# Patient Record
Sex: FEMALE | Race: WHITE | NOT HISPANIC OR LATINO
[De-identification: names, ages, dates, MRNs, and addresses within clinical notes are randomized per-mention and may not be internally consistent; named-entity substitution may affect disease eponyms.]

---

## 2018-12-20 ENCOUNTER — APPOINTMENT (OUTPATIENT)
Dept: GASTROENTEROLOGY | Facility: CLINIC | Age: 58
End: 2018-12-20
Payer: COMMERCIAL

## 2018-12-20 VITALS
WEIGHT: 186 LBS | DIASTOLIC BLOOD PRESSURE: 83 MMHG | BODY MASS INDEX: 29.19 KG/M2 | HEART RATE: 72 BPM | HEIGHT: 67 IN | SYSTOLIC BLOOD PRESSURE: 138 MMHG

## 2018-12-20 DIAGNOSIS — K76.0 FATTY (CHANGE OF) LIVER, NOT ELSEWHERE CLASSIFIED: ICD-10-CM

## 2018-12-20 DIAGNOSIS — Z83.79 FAMILY HISTORY OF OTHER DISEASES OF THE DIGESTIVE SYSTEM: ICD-10-CM

## 2018-12-20 DIAGNOSIS — R14.0 ABDOMINAL DISTENSION (GASEOUS): ICD-10-CM

## 2018-12-20 DIAGNOSIS — Z85.828 PERSONAL HISTORY OF OTHER MALIGNANT NEOPLASM OF SKIN: ICD-10-CM

## 2018-12-20 DIAGNOSIS — K58.1 IRRITABLE BOWEL SYNDROME WITH CONSTIPATION: ICD-10-CM

## 2018-12-20 DIAGNOSIS — E66.3 OVERWEIGHT: ICD-10-CM

## 2018-12-20 DIAGNOSIS — K21.9 GASTRO-ESOPHAGEAL REFLUX DISEASE W/OUT ESOPHAGITIS: ICD-10-CM

## 2018-12-20 DIAGNOSIS — Z12.11 ENCOUNTER FOR SCREENING FOR MALIGNANT NEOPLASM OF COLON: ICD-10-CM

## 2018-12-20 DIAGNOSIS — E73.9 LACTOSE INTOLERANCE, UNSPECIFIED: ICD-10-CM

## 2018-12-20 DIAGNOSIS — R15.9 FULL INCONTINENCE OF FECES: ICD-10-CM

## 2018-12-20 DIAGNOSIS — L65.9 NONSCARRING HAIR LOSS, UNSPECIFIED: ICD-10-CM

## 2018-12-20 PROCEDURE — 82274 ASSAY TEST FOR BLOOD FECAL: CPT | Mod: QW

## 2018-12-20 PROCEDURE — 99244 OFF/OP CNSLTJ NEW/EST MOD 40: CPT

## 2018-12-20 RX ORDER — LORATADINE 5 MG
17 TABLET,CHEWABLE ORAL
Refills: 0 | Status: ACTIVE | COMMUNITY

## 2018-12-20 RX ORDER — BIOTIN 10 MG
10000 TABLET ORAL
Refills: 0 | Status: ACTIVE | COMMUNITY

## 2018-12-20 RX ORDER — FINASTERIDE 5 MG/1
5 TABLET, FILM COATED ORAL
Refills: 0 | Status: ACTIVE | COMMUNITY

## 2018-12-20 RX ORDER — POLYETHYLENE GLYCOL-3350, SODIUM CHLORIDE, POTASSIUM CHLORIDE AND SODIUM BICARBONATE 420; 11.2; 5.72; 1.48 G/438.4G; G/438.4G; G/438.4G; G/438.4G
420 POWDER, FOR SOLUTION ORAL
Qty: 1 | Refills: 0 | Status: ACTIVE | COMMUNITY
Start: 2018-12-20 | End: 1900-01-01

## 2018-12-24 ENCOUNTER — LABORATORY RESULT (OUTPATIENT)
Age: 58
End: 2018-12-24

## 2018-12-24 ENCOUNTER — APPOINTMENT (OUTPATIENT)
Dept: GASTROENTEROLOGY | Facility: CLINIC | Age: 58
End: 2018-12-24
Payer: COMMERCIAL

## 2018-12-24 PROCEDURE — 45380 COLONOSCOPY AND BIOPSY: CPT

## 2018-12-27 ENCOUNTER — TRANSCRIPTION ENCOUNTER (OUTPATIENT)
Age: 58
End: 2018-12-27

## 2023-07-27 ENCOUNTER — APPOINTMENT (OUTPATIENT)
Dept: ORTHOPEDIC SURGERY | Facility: CLINIC | Age: 63
End: 2023-07-27
Payer: COMMERCIAL

## 2023-07-27 VITALS — WEIGHT: 185 LBS | BODY MASS INDEX: 29.73 KG/M2 | HEIGHT: 66 IN

## 2023-07-27 DIAGNOSIS — M77.8 OTHER ENTHESOPATHIES, NOT ELSEWHERE CLASSIFIED: ICD-10-CM

## 2023-07-27 DIAGNOSIS — M19.011 PRIMARY OSTEOARTHRITIS, RIGHT SHOULDER: ICD-10-CM

## 2023-07-27 PROCEDURE — 73564 X-RAY EXAM KNEE 4 OR MORE: CPT | Mod: 50

## 2023-07-27 PROCEDURE — 99214 OFFICE O/P EST MOD 30 MIN: CPT

## 2023-07-27 PROCEDURE — 73030 X-RAY EXAM OF SHOULDER: CPT | Mod: RT

## 2023-07-27 RX ORDER — ATORVASTATIN CALCIUM 80 MG/1
TABLET, FILM COATED ORAL
Refills: 0 | Status: ACTIVE | COMMUNITY

## 2023-07-27 NOTE — DISCUSSION/SUMMARY
[de-identified] : Patient allowed to gently start resuming activities.\par Discussed change to medication prescription and usage. \par Offered cortisone steroid injection. \par Bracing options discussed with patient. \par Hyaluronic Acid inj pamphlet given to pt.\par use sleeves

## 2023-07-27 NOTE — HISTORY OF PRESENT ILLNESS
[8] : 8 [0] : 0 [Rest] : rest [Meds] : meds [de-identified] : 62 year old RHD  female with pain in bilateral knees and her right shoulder. The knees have been progressive, no specific injury. PAin with walking, stairs, stiffness. No mechanical symptoms. OTC topical creams and medications are helpful short term. Her right shoulder has been clicking, pain with reaching over head, lifting. Worse when hikes.No prior history of injury., has known labral tear L shoulder [] : no [FreeTextEntry1] : bilateral knees and rightshoulder  [FreeTextEntry5] : pt has been having ongoing bilateral knee pain, also having some right shoulder pain and clicking  [FreeTextEntry9] : motrin  [de-identified] : movement  [de-identified] : 2021 [de-identified] :   [de-identified] : a few years ago

## 2023-07-27 NOTE — PHYSICAL EXAM
[Sitting] : sitting [Moderate] : moderate [Right] : right shoulder [5___] : hamstring 5[unfilled]/5 [Bilateral] : knee bilaterally [All Views] : anteroposterior, lateral, skyline, and anteroposterior standing [There are no fractures, subluxations or dislocations. No significant abnormalities are seen] : There are no fractures, subluxations or dislocations. No significant abnormalities are seen [TWNoteComboBox6] : internal rotation L4 [de-identified] : external rotation 70 degrees [] : no lateral joint line tenderness [Degenerative change] : Degenerative change [TWNoteComboBox7] : flexion 125 degrees [de-identified] : extension 0 degrees

## 2024-02-02 ENCOUNTER — TRANSCRIPTION ENCOUNTER (OUTPATIENT)
Age: 64
End: 2024-02-02

## 2024-04-04 ENCOUNTER — APPOINTMENT (OUTPATIENT)
Dept: MRI IMAGING | Facility: CLINIC | Age: 64
End: 2024-04-04
Payer: COMMERCIAL

## 2024-04-04 ENCOUNTER — APPOINTMENT (OUTPATIENT)
Dept: ORTHOPEDIC SURGERY | Facility: CLINIC | Age: 64
End: 2024-04-04
Payer: COMMERCIAL

## 2024-04-04 VITALS — BODY MASS INDEX: 32.14 KG/M2 | WEIGHT: 200 LBS | HEIGHT: 66 IN

## 2024-04-04 DIAGNOSIS — M23.92 UNSPECIFIED INTERNAL DERANGEMENT OF LEFT KNEE: ICD-10-CM

## 2024-04-04 PROCEDURE — 73564 X-RAY EXAM KNEE 4 OR MORE: CPT | Mod: LT

## 2024-04-04 PROCEDURE — 73721 MRI JNT OF LWR EXTRE W/O DYE: CPT | Mod: LT

## 2024-04-04 PROCEDURE — 99214 OFFICE O/P EST MOD 30 MIN: CPT

## 2024-04-04 RX ORDER — IBUPROFEN 600 MG/1
600 TABLET ORAL
Qty: 30 | Refills: 0 | Status: ACTIVE | COMMUNITY
Start: 2024-04-04 | End: 1900-01-01

## 2024-04-04 RX ORDER — DUTASTERIDE 0.5 MG/1
CAPSULE, LIQUID FILLED ORAL
Refills: 0 | Status: ACTIVE | COMMUNITY

## 2024-04-04 RX ORDER — MINOXIDIL 2.5 MG/1
TABLET ORAL
Refills: 0 | Status: ACTIVE | COMMUNITY

## 2024-04-04 NOTE — DISCUSSION/SUMMARY
[de-identified] : MRI L knee - eval LMT ibu 600 HKB - pt has jordi corral p mri   -----------------------------------------------  Patient warned of specific risks of medication related to bleeding, GI issues, increase blood pressure, and cardiac risks in addition to additional risks. Patient advised to discuss with PMD if any presence of stated issues.

## 2024-04-04 NOTE — HISTORY OF PRESENT ILLNESS
[de-identified] : soreness began a few weeks ago in the left knee, worsened after she mis stepped while playing pickleball an hour ago. felt a pop in the outer left knee. pt sees dr richey for the knee. pain when standing, sitting, walking, pain at rest. difficulty ambulating pain radiates into the upper thigh when sitting.  [] : no

## 2024-04-04 NOTE — IMAGING
[Left] : left knee [Moderate tricompartmental OA medial narrowing] : Moderate tricompartmental OA medial narrowing [Moderate patellofemoral OA] : Moderate patellofemoral OA [de-identified] : Left Knee Exam:                             General: no distress, no ligamentous laxity Skin: no significant pertinent finding Inspection:  Effusion: (+)  Malalignment: (-)  Swelling: (-)  Quad atrophy: (-)  J-sign: (-) ROM:  3 - 90 degrees of flexion. Tenderness:  MJLT: (+)  MFC. (-)  LJLT: (+)  Crepitus: (+)  Patellar grind tenderness: (-)  Patellar tendon: (-)  + LCF Stability:  Lachman: (-)  Varus/Valgus instability: (-)  Posterior drawer: (-)  Patellar translation: wnl Additional tests:  McMurrays test: (+)  Patellar apprehension: (-)  Patellar tilt: (-)  Tight lateral retinaculum: (-) Strength: 5/5 Q/H/TA/GS/EHL Neuro: In tact to light touch throughout, DTR's wnl Vascularity: Extremity warm and well perfused Gait: antalgic     [FreeTextEntry9] : no obvious fracture or subluxation

## 2024-04-05 ENCOUNTER — APPOINTMENT (OUTPATIENT)
Dept: ORTHOPEDIC SURGERY | Facility: CLINIC | Age: 64
End: 2024-04-05
Payer: COMMERCIAL

## 2024-04-05 VITALS — WEIGHT: 200 LBS | BODY MASS INDEX: 32.14 KG/M2 | HEIGHT: 66 IN

## 2024-04-05 DIAGNOSIS — M23.307 OTHER MENISCUS DERANGEMENTS, UNSPECIFIED MENISCUS, LEFT KNEE: ICD-10-CM

## 2024-04-05 DIAGNOSIS — M25.852 OTHER SPECIFIED JOINT DISORDERS, LEFT HIP: ICD-10-CM

## 2024-04-05 PROCEDURE — 20610 DRAIN/INJ JOINT/BURSA W/O US: CPT | Mod: LT

## 2024-04-05 PROCEDURE — 99214 OFFICE O/P EST MOD 30 MIN: CPT | Mod: 25

## 2024-04-05 PROCEDURE — 99213 OFFICE O/P EST LOW 20 MIN: CPT | Mod: 25

## 2024-04-05 NOTE — HISTORY OF PRESENT ILLNESS
[Gradual] : gradual [8] : 8 [6] : 6 [Dull/Aching] : dull/aching [Sharp] : sharp [Stabbing] : stabbing [Frequent] : frequent [Leisure] : leisure [Rest] : rest [Exercising] : exercising [de-identified] : Pt went to urgent care and obtained MRI of the L knee and was previously seeing Dr Purdy  . Pt notes instability and pain in the L knee. Pt has never had CSI or HA injections of the L knee     [] : Post Surgical Visit: no [FreeTextEntry1] : left knee  [de-identified] : xrays/mri

## 2024-04-05 NOTE — DISCUSSION/SUMMARY
[Medication Risks Reviewed] : Medication risks reviewed [de-identified] : Reviewed MRI of the L knee  Discussed risk of sx intervention for pt's meniscus tear in the setting of knee OA Discussed/ recommend treating with conservative treatment  in the form of continued PT and CSI vs HA injections  for the L knee  Plan for L knee CSI injection  today Will submit for HA injections of the L knee  ----------------------------------------------------------------------------   All relevant imaging studies pertinent to today's visit, including x-rays, MRI's and/or other advanced imaging studies (CT/etc) were independently interpreted and reviewed with the patient as needed. Implications of the studies together with the patient's clinical picture were discussed to formulate a working diagnosis and management options were detailed.   The patient and/or guardian was advised of the diagnosis.  The natural history of the pathology was explained in full. All questions were answered.  The risks and benefits of conservative and interventional treatment alternatives were explained to the patient  The patient and/or guardian was advised if any advanced diagnostic/imaging study (MRI/CT/etc) is ordered to evaluate potential pathology in the affected area(s), they should follow up in the office to review the results of the study and determine further management that may be indicated.    ----------------------------------------------------------------------------  Large joint corticosteroid injection given: Left knee  Patient indicated for injection after trial of rest, OTC medications including aspirin, Ibuprofen, Aleve etc or prescription NSAIDS, and/or exercises at home and/ or physical therapy without satisfactory response.  Patient has symptoms including pain, swelling, and/or decreased mobility in the joint. The risks, benefits, and alternatives to corticosteroid injection were explained in full to the patient, including but not limited to infection, sepsis, bleeding, scarring, skin discoloration, temporary increase in pain, syncopal episode, failure to resolve symptoms, allergic reaction, symptom recurrence, and elevation of blood sugar in diabetics. Patient understood the risks. All questions were answered. After discussion of options, patient requested an injection.   Oral informed consent was obtained and sterile technique was utilized for the procedure including the preparation of the solutions used for the injection and betadine followed by alcohol prep to the injection site. Anesthesia was given with ethyl chloride sprayed topically. The injection was delivered. Patient tolerated the procedure well.   Post Procedure Instructions: Patient was advised to call if redness, pain, or fever occur and apply ice for 15 min on and 15 min off later today  Medications delivered: Kenalog 10 mg, Lidocaine: 4 cc

## 2024-04-05 NOTE — IMAGING
[de-identified] :   ----------------------------------------------------------------------------   Left knee exam:   Skin: no significant pertinent finding  Inspection:     (neg) Effusion     (neg) Malalignment     (neg) Swelling     (neg) Quad atrophy     (neg) J-sign  ROM:     0 - 135 degrees of flexion.  Tenderness:     (neg) MJLT     (neg) LJLT     (neg) Medial patellar facet tenderness     (neg) Lateral patellar facet tenderness     (neg) Crepitus     (neg) Patellar grind tenderness     (neg) Patellar tendon     (neg) Quad tendon     Other:  Stability:     (neg) Lachman     (neg) Varus/Valgus instability     (neg) Posterior drawer     (neg) Patellar translation: wnl  Additional tests:     (neg) McMurrays test     (neg) Patellar apprehension     Other:  Strength: 5/5 Q/H/TA/GS/EHL  Neuro: In tact to light touch throughout, DTR's wnl  Vascularity: Extremity warm and well perfused  Gait: normal      ----------------------------------------------------------------------------   Left hip exam:   Inspection: no deformity, no swelling, no gross limb length discrepancy ROM:    Flexion: 100    ER: 50    IR: 20 Tenderness:    (neg) Groin tenderness    (+) Greater trochanteric tenderness    (neg) Buttock tenderness    (neg) IT Band tenderness    (neg) Anterior thigh tenderness    (neg)ASIS tenderness    (neg) Ischial tuberosity    (neg) Hamstring muscle tenderness Additional tests:    (+) hip impingment     (+) FADIR    (neg) LALY    (neg) Resisted hip flexion pain    (neg) Apprehension (external rotation/extension)    (neg) Posterior pain with forced hip flexion and knee extension    (neg) Tight hamstrings    (neg) Log roll    (neg) Axial load Strength: 5/5 IP/Q/H/TA/GS/EHL Neuro: In tact to light touch throughout, DTR's wnl Vascularity: Extremity warm and well perfused Gait: normal.

## 2024-04-05 NOTE — DATA REVIEWED
[MRI] : MRI [Right] : of the right [I independently reviewed and interpreted images and report] : I independently reviewed and interpreted images and report [FreeTextEntry1] : significant PF OA severe, moderate to medial OA, radial root tear of the posterior horn of medial meniscus

## 2024-04-16 RX ORDER — HYALURONATE SODIUM 20 MG/2 ML
20 SYRINGE (ML) INTRAARTICULAR
Qty: 3 | Refills: 0 | Status: ACTIVE | COMMUNITY
Start: 2024-04-16 | End: 1900-01-01

## 2024-04-29 ENCOUNTER — APPOINTMENT (OUTPATIENT)
Dept: ORTHOPEDIC SURGERY | Facility: CLINIC | Age: 64
End: 2024-04-29
Payer: COMMERCIAL

## 2024-04-29 VITALS — WEIGHT: 200 LBS | BODY MASS INDEX: 32.14 KG/M2 | HEIGHT: 66 IN

## 2024-04-29 DIAGNOSIS — M17.12 UNILATERAL PRIMARY OSTEOARTHRITIS, LEFT KNEE: ICD-10-CM

## 2024-04-29 DIAGNOSIS — M17.11 UNILATERAL PRIMARY OSTEOARTHRITIS, RIGHT KNEE: ICD-10-CM

## 2024-04-29 PROCEDURE — 20610 DRAIN/INJ JOINT/BURSA W/O US: CPT | Mod: 50

## 2024-04-29 PROCEDURE — 99214 OFFICE O/P EST MOD 30 MIN: CPT | Mod: 25

## 2024-04-29 NOTE — IMAGING
[All Views] : anteroposterior, lateral, skyline, and anteroposterior standing [Moderate tricompartmental OA medial narrowing] : Moderate tricompartmental OA medial narrowing [Moderate patellofemoral OA] : Moderate patellofemoral OA [de-identified] :   ----------------------------------------------------------------------------   bilateral knee exam:   Skin: no significant pertinent finding  Inspection:     (+) Effusion     (neg) Malalignment     (neg) Swelling     (neg) Quad atrophy     (neg) J-sign  ROM:     0 - 115 degrees of flexion.  Tenderness:     (+) MJLT     (neg) LJLT     (neg) Medial patellar facet tenderness     (neg) Lateral patellar facet tenderness     (+) Crepitus     (neg) Patellar grind tenderness     (neg) Patellar tendon     (neg) Quad tendon     Other:  Stability:     (neg) Lachman     (neg) Varus/Valgus instability     (neg) Posterior drawer     (neg) Patellar translation: wnl  Additional tests:     (+) McMurrays test     (neg) Patellar apprehension     Other:  Strength: 5/5 Q/H/TA/GS/EHL  Neuro: In tact to light touch throughout, DTR's wnl  Vascularity: Extremity warm and well perfused  Gait: normal      ----------------------------------------------------------------------------   Left hip exam:   Inspection: no deformity, no swelling, no gross limb length discrepancy ROM:    Flexion: 100    ER: 50    IR: 20 Tenderness:    (neg) Groin tenderness    (+) Greater trochanteric tenderness    (neg) Buttock tenderness    (neg) IT Band tenderness    (neg) Anterior thigh tenderness    (neg)ASIS tenderness    (neg) Ischial tuberosity    (neg) Hamstring muscle tenderness Additional tests:    (+) hip impingment     (+) FADIR    (neg) LALY    (neg) Resisted hip flexion pain    (neg) Apprehension (external rotation/extension)    (neg) Posterior pain with forced hip flexion and knee extension    (neg) Tight hamstrings    (neg) Log roll    (neg) Axial load Strength: 5/5 IP/Q/H/TA/GS/EHL Neuro: In tact to light touch throughout, DTR's wnl Vascularity: Extremity warm and well perfused Gait: normal.    [Bilateral] : knee bilaterally

## 2024-04-29 NOTE — DISCUSSION/SUMMARY
[Medication Risks Reviewed] : Medication risks reviewed [de-identified] : Discussed options, plan for CSI right knee submit for R knees euflexxa f/u for HA ----------------------------------------------------------------------------  Large joint corticosteroid injection given: Right knee  Patient indicated for injection after trial of rest, OTC medications including aspirin, Ibuprofen, Aleve etc or prescription NSAIDS, and/or exercises at home and/ or physical therapy without satisfactory response.  Patient has symptoms including pain, swelling, and/or decreased mobility in the joint. The risks, benefits, and alternatives to corticosteroid injection were explained in full to the patient, including but not limited to infection, sepsis, bleeding, scarring, skin discoloration, temporary increase in pain, syncopal episode, failure to resolve symptoms, allergic reaction, symptom recurrence, and elevation of blood sugar in diabetics. Patient understood the risks. All questions were answered. After discussion of options, patient requested an injection.   Oral informed consent was obtained and sterile technique was utilized for the procedure including the preparation of the solutions used for the injection and betadine followed by alcohol prep to the injection site. Anesthesia was given with ethyl chloride sprayed topically. The injection was delivered. Patient tolerated the procedure well.   Post Procedure Instructions: Patient was advised to call if redness, pain, or fever occur and apply ice for 15 min on and 15 min off later today  Medications delivered: Kenalog 10 mg, Lidocaine: 4 cc    ----------------------------------------------------------------------------   All relevant imaging studies pertinent to today's visit, including x-rays, MRI's and/or other advanced imaging studies (CT/etc) were independently interpreted and reviewed with the patient as needed. Implications of the studies together with the patient's clinical picture were discussed to formulate a working diagnosis and management options were detailed.   The patient and/or guardian was advised of the diagnosis.  The natural history of the pathology was explained in full. All questions were answered.  The risks and benefits of conservative and interventional treatment alternatives were explained to the patient  The patient and/or guardian was advised if any advanced diagnostic/imaging study (MRI/CT/etc) is ordered to evaluate potential pathology in the affected area(s), they should follow up in the office to review the results of the study and determine further management that may be indicated.

## 2024-04-29 NOTE — HISTORY OF PRESENT ILLNESS
[Sudden] : sudden [7] : 7 [Sharp] : sharp [Stairs] : stairs [de-identified] : This is Ms. ALTAGRACIA WOO  a 63 year old female who comes in today complaining of right knee pain since she hurt her left knee due to compensation.  She wants to do therapy for her right knee [] : no [de-identified] : dr richey [de-identified] : xray

## 2024-06-11 ENCOUNTER — NON-APPOINTMENT (OUTPATIENT)
Age: 64
End: 2024-06-11

## 2024-08-29 ENCOUNTER — APPOINTMENT (OUTPATIENT)
Dept: GASTROENTEROLOGY | Facility: CLINIC | Age: 64
End: 2024-08-29
Payer: COMMERCIAL

## 2024-08-29 VITALS
HEIGHT: 66 IN | WEIGHT: 200 LBS | DIASTOLIC BLOOD PRESSURE: 74 MMHG | HEART RATE: 86 BPM | BODY MASS INDEX: 32.14 KG/M2 | SYSTOLIC BLOOD PRESSURE: 118 MMHG

## 2024-08-29 DIAGNOSIS — K21.9 GASTRO-ESOPHAGEAL REFLUX DISEASE W/OUT ESOPHAGITIS: ICD-10-CM

## 2024-08-29 DIAGNOSIS — Z12.11 ENCOUNTER FOR SCREENING FOR MALIGNANT NEOPLASM OF COLON: ICD-10-CM

## 2024-08-29 DIAGNOSIS — E78.5 HYPERLIPIDEMIA, UNSPECIFIED: ICD-10-CM

## 2024-08-29 DIAGNOSIS — Z86.19 PERSONAL HISTORY OF OTHER INFECTIOUS AND PARASITIC DISEASES: ICD-10-CM

## 2024-08-29 DIAGNOSIS — E66.9 OBESITY, UNSPECIFIED: ICD-10-CM

## 2024-08-29 DIAGNOSIS — R15.9 FULL INCONTINENCE OF FECES: ICD-10-CM

## 2024-08-29 DIAGNOSIS — R05.9 COUGH, UNSPECIFIED: ICD-10-CM

## 2024-08-29 PROCEDURE — 82274 ASSAY TEST FOR BLOOD FECAL: CPT | Mod: QW

## 2024-08-29 PROCEDURE — 99203 OFFICE O/P NEW LOW 30 MIN: CPT | Mod: 25

## 2024-08-29 RX ORDER — OMEPRAZOLE MAGNESIUM 20 MG/1
20 TABLET, DELAYED RELEASE ORAL
Refills: 0 | Status: ACTIVE | COMMUNITY

## 2024-08-29 NOTE — REVIEW OF SYSTEMS
[Constipation] : constipation [Heartburn] : heartburn [Joint Stiffness] : joint stiffness [Negative] : Heme/Lymph

## 2024-08-29 NOTE — PHYSICAL EXAM
[Alert] : alert [Normal Voice/Communication] : normal voice/communication [No Acute Distress] : no acute distress [Well Developed] : well developed [Well Nourished] : well nourished [Obese (BMI >= 30)] : obese (BMI >= 30) [None] : no edema [Abdomen Tenderness] : non-tender [No Masses] : no abdominal mass palpated [] : no hepatosplenomegaly [No Hernia] : no hernia [Diminished] : diminished [Normal Sphincter Tone] : normal sphincter tone [Inguinal Lymph Nodes Enlarged Bilaterally] : no inguinal lymphadenopathy [Normal Color / Pigmentation] : normal skin color and pigmentation [Normal] : oriented to person, place, and time [Occult Blood] : negative occult blood [FIT Test] : negative FIT test [de-identified] : softly distended

## 2024-08-29 NOTE — ASSESSMENT
[FreeTextEntry1] : 1.  Chronic constipation with gas, bloating, tenesmus, incontinence; scattered diverticulosis, tortuous redundant colon with looping, hemorrhoids at repeat colonoscopy December 2018.  Worse incontinence during/after RSV, now back to baseline.  Stool guaiac negative with negative FIT on today's exam. 2.  GERD with occasional nocturnal cough--possible LPRD. 3.  Probable lactose intolerance. 4.  Obesity. 5.  Status post Mohs surgery for basal and squamous cell cancers. 6.  Hyperlipidemia. 7.  History of alopecia. 8.  Allergic to sulfa.  Plan: 1.  PMD to forward latest labs for my review.  Other medical records reviewed. 2.  Lengthy discussion ensued regarding colorectal cancer screening, whether to proceed with repeat colonoscopy and/or Cologuard.  As she is have average risk and symptoms are at baseline, without family history of colorectal neoplasia, she opted for Cologuard.  She is aware of the possibility of false negatives and false positives with Cologuard, however.  If positive, diagnostic colonoscopy will be necessary.  Cologuard is otherwise repeated at 3-year intervals. 3.  Try famotidine 20 mg at bedtime for 3 months to see if nocturnal cough and GERD improve. 4.  Psyllium (such as Metamucil) daily to improve continence. 5.  NIH handout on "Fecal Incontinence" given and discussed.  Dietary instruction given.

## 2024-08-29 NOTE — CONSULT LETTER
[Dear  ___] : Dear  [unfilled], [Courtesy Letter:] : I had the pleasure of seeing your patient, [unfilled], in my office today. [Please see my note below.] : Please see my note below. [Consult Closing:] : Thank you very much for allowing me to participate in the care of this patient.  If you have any questions, please do not hesitate to contact me. [Sincerely,] : Sincerely, [FreeTextEntry3] : Garett Brown M.D.

## 2024-08-29 NOTE — HISTORY OF PRESENT ILLNESS
[FreeTextEntry1] : Repeat colonoscopy December 2018 revealed scattered diverticulosis, tortuous redundant colon with looping, and hemorrhoids.  No precancerous polyps have been found on her colonoscopies, and family history is negative for colorectal neoplasia.  She wonders if she might be a candidate for Cologuard.  She has had issues with stress fecal and urinary incontinence, became worse during/after RSV last November, but has returned to baseline.  She continues to be constipated, with gas and bloating.  She has been taking MiraLAX regularly.  She notes occasional reflux, takes Prilosec either prophylactically or on-demand.  She has had nocturnal cough from time to time, tries not to eat after 7-7:30 PM.

## 2024-09-17 ENCOUNTER — NON-APPOINTMENT (OUTPATIENT)
Age: 64
End: 2024-09-17

## 2024-09-18 PROBLEM — R19.5 POSITIVE COLORECTAL CANCER SCREENING USING COLOGUARD TEST: Status: ACTIVE | Noted: 2024-09-18

## 2024-09-19 ENCOUNTER — NON-APPOINTMENT (OUTPATIENT)
Age: 64
End: 2024-09-19

## 2024-11-25 ENCOUNTER — APPOINTMENT (OUTPATIENT)
Dept: GASTROENTEROLOGY | Facility: CLINIC | Age: 64
End: 2024-11-25

## 2025-04-21 ENCOUNTER — NON-APPOINTMENT (OUTPATIENT)
Age: 65
End: 2025-04-21